# Patient Record
Sex: FEMALE | Race: WHITE | NOT HISPANIC OR LATINO | Employment: FULL TIME | URBAN - NONMETROPOLITAN AREA
[De-identification: names, ages, dates, MRNs, and addresses within clinical notes are randomized per-mention and may not be internally consistent; named-entity substitution may affect disease eponyms.]

---

## 2024-09-05 ENCOUNTER — APPOINTMENT (OUTPATIENT)
Dept: CARDIOLOGY | Facility: HOSPITAL | Age: 49
End: 2024-09-05
Payer: COMMERCIAL

## 2024-09-05 ENCOUNTER — APPOINTMENT (OUTPATIENT)
Dept: RADIOLOGY | Facility: HOSPITAL | Age: 49
End: 2024-09-05
Payer: COMMERCIAL

## 2024-09-05 ENCOUNTER — HOSPITAL ENCOUNTER (EMERGENCY)
Facility: HOSPITAL | Age: 49
Discharge: HOME | End: 2024-09-05
Attending: EMERGENCY MEDICINE
Payer: COMMERCIAL

## 2024-09-05 VITALS
SYSTOLIC BLOOD PRESSURE: 131 MMHG | RESPIRATION RATE: 18 BRPM | BODY MASS INDEX: 26.22 KG/M2 | DIASTOLIC BLOOD PRESSURE: 86 MMHG | HEIGHT: 63 IN | TEMPERATURE: 98.6 F | WEIGHT: 148 LBS | OXYGEN SATURATION: 96 % | HEART RATE: 74 BPM

## 2024-09-05 DIAGNOSIS — R00.2 PALPITATIONS: ICD-10-CM

## 2024-09-05 DIAGNOSIS — R07.9 CHEST PAIN, UNSPECIFIED TYPE: Primary | ICD-10-CM

## 2024-09-05 LAB
ALBUMIN SERPL BCP-MCNC: 4.9 G/DL (ref 3.4–5)
ALP SERPL-CCNC: 74 U/L (ref 33–110)
ALT SERPL W P-5'-P-CCNC: 16 U/L (ref 7–45)
ANION GAP SERPL CALC-SCNC: 11 MMOL/L (ref 10–20)
AST SERPL W P-5'-P-CCNC: 20 U/L (ref 9–39)
ATRIAL RATE: 69 BPM
ATRIAL RATE: 82 BPM
BASOPHILS # BLD AUTO: 0.05 X10*3/UL (ref 0–0.1)
BASOPHILS NFR BLD AUTO: 0.9 %
BILIRUB SERPL-MCNC: 0.4 MG/DL (ref 0–1.2)
BUN SERPL-MCNC: 18 MG/DL (ref 6–23)
CALCIUM SERPL-MCNC: 11 MG/DL (ref 8.6–10.3)
CARDIAC TROPONIN I PNL SERPL HS: <3 NG/L (ref 0–13)
CARDIAC TROPONIN I PNL SERPL HS: <3 NG/L (ref 0–13)
CHLORIDE SERPL-SCNC: 103 MMOL/L (ref 98–107)
CO2 SERPL-SCNC: 29 MMOL/L (ref 21–32)
CREAT SERPL-MCNC: 0.95 MG/DL (ref 0.5–1.05)
EGFRCR SERPLBLD CKD-EPI 2021: 74 ML/MIN/1.73M*2
EOSINOPHIL # BLD AUTO: 0.17 X10*3/UL (ref 0–0.7)
EOSINOPHIL NFR BLD AUTO: 3.1 %
ERYTHROCYTE [DISTWIDTH] IN BLOOD BY AUTOMATED COUNT: 11.6 % (ref 11.5–14.5)
GLUCOSE SERPL-MCNC: 137 MG/DL (ref 74–99)
HCT VFR BLD AUTO: 44.1 % (ref 36–46)
HGB BLD-MCNC: 14.4 G/DL (ref 12–16)
IMM GRANULOCYTES # BLD AUTO: 0.01 X10*3/UL (ref 0–0.7)
IMM GRANULOCYTES NFR BLD AUTO: 0.2 % (ref 0–0.9)
LYMPHOCYTES # BLD AUTO: 2.27 X10*3/UL (ref 1.2–4.8)
LYMPHOCYTES NFR BLD AUTO: 40.8 %
MAGNESIUM SERPL-MCNC: 1.97 MG/DL (ref 1.6–2.4)
MCH RBC QN AUTO: 30.8 PG (ref 26–34)
MCHC RBC AUTO-ENTMCNC: 32.7 G/DL (ref 32–36)
MCV RBC AUTO: 94 FL (ref 80–100)
MONOCYTES # BLD AUTO: 0.4 X10*3/UL (ref 0.1–1)
MONOCYTES NFR BLD AUTO: 7.2 %
NEUTROPHILS # BLD AUTO: 2.66 X10*3/UL (ref 1.2–7.7)
NEUTROPHILS NFR BLD AUTO: 47.8 %
NRBC BLD-RTO: 0 /100 WBCS (ref 0–0)
P AXIS: 72 DEGREES
P AXIS: 80 DEGREES
P OFFSET: 214 MS
P OFFSET: 217 MS
P ONSET: 162 MS
P ONSET: 163 MS
PLATELET # BLD AUTO: 224 X10*3/UL (ref 150–450)
POTASSIUM SERPL-SCNC: 4.1 MMOL/L (ref 3.5–5.3)
PR INTERVAL: 116 MS
PR INTERVAL: 118 MS
PROT SERPL-MCNC: 7.4 G/DL (ref 6.4–8.2)
Q ONSET: 220 MS
Q ONSET: 222 MS
QRS COUNT: 11 BEATS
QRS COUNT: 13 BEATS
QRS DURATION: 86 MS
QRS DURATION: 88 MS
QT INTERVAL: 370 MS
QT INTERVAL: 370 MS
QTC CALCULATION(BAZETT): 396 MS
QTC CALCULATION(BAZETT): 432 MS
QTC FREDERICIA: 387 MS
QTC FREDERICIA: 410 MS
R AXIS: 71 DEGREES
R AXIS: 80 DEGREES
RBC # BLD AUTO: 4.67 X10*6/UL (ref 4–5.2)
SODIUM SERPL-SCNC: 139 MMOL/L (ref 136–145)
T AXIS: 69 DEGREES
T AXIS: 82 DEGREES
T OFFSET: 405 MS
T OFFSET: 407 MS
VENTRICULAR RATE: 69 BPM
VENTRICULAR RATE: 82 BPM
WBC # BLD AUTO: 5.6 X10*3/UL (ref 4.4–11.3)

## 2024-09-05 PROCEDURE — 84443 ASSAY THYROID STIM HORMONE: CPT

## 2024-09-05 PROCEDURE — 36415 COLL VENOUS BLD VENIPUNCTURE: CPT | Performed by: EMERGENCY MEDICINE

## 2024-09-05 PROCEDURE — 83735 ASSAY OF MAGNESIUM: CPT | Performed by: EMERGENCY MEDICINE

## 2024-09-05 PROCEDURE — 93005 ELECTROCARDIOGRAM TRACING: CPT

## 2024-09-05 PROCEDURE — 71045 X-RAY EXAM CHEST 1 VIEW: CPT

## 2024-09-05 PROCEDURE — 85025 COMPLETE CBC W/AUTO DIFF WBC: CPT | Performed by: EMERGENCY MEDICINE

## 2024-09-05 PROCEDURE — 84484 ASSAY OF TROPONIN QUANT: CPT | Performed by: EMERGENCY MEDICINE

## 2024-09-05 PROCEDURE — 84075 ASSAY ALKALINE PHOSPHATASE: CPT | Performed by: EMERGENCY MEDICINE

## 2024-09-05 PROCEDURE — 99284 EMERGENCY DEPT VISIT MOD MDM: CPT | Mod: 25

## 2024-09-05 PROCEDURE — 71045 X-RAY EXAM CHEST 1 VIEW: CPT | Performed by: RADIOLOGY

## 2024-09-05 RX ORDER — DIPHENHYDRAMINE HCL 25 MG
25 TABLET ORAL NIGHTLY PRN
COMMUNITY

## 2024-09-05 RX ORDER — IBUPROFEN 200 MG
200 TABLET ORAL EVERY 6 HOURS
COMMUNITY

## 2024-09-05 ASSESSMENT — HEART SCORE
ECG: NORMAL
HISTORY: SLIGHTLY SUSPICIOUS
HEART SCORE: 1
RISK FACTORS: NO KNOWN RISK FACTORS
AGE: 45-64
TROPONIN: LESS THAN OR EQUAL TO NORMAL LIMIT

## 2024-09-05 ASSESSMENT — COLUMBIA-SUICIDE SEVERITY RATING SCALE - C-SSRS
1. IN THE PAST MONTH, HAVE YOU WISHED YOU WERE DEAD OR WISHED YOU COULD GO TO SLEEP AND NOT WAKE UP?: NO
2. HAVE YOU ACTUALLY HAD ANY THOUGHTS OF KILLING YOURSELF?: NO
6. HAVE YOU EVER DONE ANYTHING, STARTED TO DO ANYTHING, OR PREPARED TO DO ANYTHING TO END YOUR LIFE?: NO

## 2024-09-05 ASSESSMENT — PAIN DESCRIPTION - PAIN TYPE: TYPE: ACUTE PAIN

## 2024-09-05 ASSESSMENT — PAIN SCALES - GENERAL
PAINLEVEL_OUTOF10: 2
PAINLEVEL_OUTOF10: 3
PAINLEVEL_OUTOF10: 3

## 2024-09-05 ASSESSMENT — PAIN DESCRIPTION - LOCATION: LOCATION: CHEST

## 2024-09-05 ASSESSMENT — PAIN - FUNCTIONAL ASSESSMENT: PAIN_FUNCTIONAL_ASSESSMENT: 0-10

## 2024-09-05 NOTE — ED PROVIDER NOTES
HPI   No chief complaint on file.      Limitations to History: None    HPI: 49-year-old female presents with concern for left-sided palpitations and chest tightness.  States has been present for approximately an hour.  Radiating to her left neck and arm.  States this has been intermittent over the past 2 months.  Becoming more frequent.  Began today at work.  Denies any shortness of breath, nausea, vomiting, diaphoresis.  Denies any fall or trauma.    ------------------------------------------------------------------------------------------------------------------------------------------  Physical Exam:    VS: As documented in the triage note and EMR flowsheet from this visit were reviewed.    Appearance: Alert. cooperative,  in no acute distress.   Skin: Intact,  dry skin, no lesions, rash, petechiae or purpura.   Eyes: PERRLA, EOMs intact,  Conjunctiva pink with no redness or exudates.   HENT: Normocephalic, atraumatic. Nares patent. No intraoral lesions.   Neck: Supple, without meningismus. Trachea at midline. No lymphadenopathy.  Pulmonary: Clear bilaterally with good chest wall excursion. No rales, rhonchi or wheezing. No accessory muscle use or stridor.  Cardiac: Regular rate and rhythm, no rubs, murmurs, or gallops.   Abdomen: Abdomen is soft, nontender, and nondistended.  No palpable organomegaly.  No rebound or guarding.  No CVA tenderness. Nonsurgical abdomen.  Genitourinary: Exam deferred.  Musculoskeletal: Full range of motion.  Pulses full and equal. No cyanosis, clubbing, or edema.  Neurological:  Cranial nerves are grossly intact, grossly normal sensation, no weakness, no focal findings identified.  Psychiatric: Appropriate mood and affect.                Patient History   No past medical history on file.  No past surgical history on file.  No family history on file.  Social History     Tobacco Use    Smoking status: Not on file    Smokeless tobacco: Not on file   Substance Use Topics    Alcohol use:  Not on file    Drug use: Not on file       Physical Exam   ED Triage Vitals   Temp Pulse Resp BP   -- -- -- --      SpO2 Temp src Heart Rate Source Patient Position   -- -- -- --      BP Location FiO2 (%)     -- --       Physical Exam      ED Course & MDM   Diagnoses as of 09/05/24 1446   Chest pain, unspecified type   Palpitations                 No data recorded                                 Medical Decision Making  Labs Reviewed  COMPREHENSIVE METABOLIC PANEL - Abnormal     Glucose                       137 (*)                Sodium                        139                    Potassium                     4.1                    Chloride                      103                    Bicarbonate                   29                     Anion Gap                     11                     Urea Nitrogen                 18                     Creatinine                    0.95                   eGFR                          74                     Calcium                       11.0 (*)               Albumin                       4.9                    Alkaline Phosphatase          74                     Total Protein                 7.4                    AST                           20                     Bilirubin, Total              0.4                    ALT                           16                  MAGNESIUM - Normal     Magnesium                     1.97                SERIAL TROPONIN-INITIAL - Normal     Troponin I, High Sensitivity   <3                         Narrative: Less than 99th percentile of normal range cutoff-                  Female and children under 18 years old <14 ng/L; Male <21 ng/L: Negative                  Repeat testing should be performed if clinically indicated.                                     Female and children under 18 years old 14-50 ng/L; Male 21-50 ng/L:                  Consistent with possible cardiac damage and possible increased clinical                   risk. Serial  measurements may help to assess extent of myocardial damage.                                     >50 ng/L: Consistent with cardiac damage, increased clinical risk and                  myocardial infarction. Serial measurements may help assess extent of                   myocardial damage.                                      NOTE: Children less than 1 year old may have higher baseline troponin                   levels and results should be interpreted in conjunction with the overall                   clinical context.                                     NOTE: Troponin I testing is performed using a different                   testing methodology at [facility] than at other                   [facility]. Direct result comparisons should only                   be made within the same method.  SERIAL TROPONIN, 1 HOUR - Normal     Troponin I, High Sensitivity   <3                         Narrative: Less than 99th percentile of normal range cutoff-                  Female and children under 18 years old <14 ng/L; Male <21 ng/L: Negative                  Repeat testing should be performed if clinically indicated.                                     Female and children under 18 years old 14-50 ng/L; Male 21-50 ng/L:                  Consistent with possible cardiac damage and possible increased clinical                   risk. Serial measurements may help to assess extent of myocardial damage.                                     >50 ng/L: Consistent with cardiac damage, increased clinical risk and                  myocardial infarction. Serial measurements may help assess extent of                   myocardial damage.                                      NOTE: Children less than 1 year old may have higher baseline troponin                   levels and results should be interpreted in conjunction with the overall                   clinical context.                                     NOTE: Troponin I  testing is performed using a different                   testing methodology at [facility] than at other                   [facility]. Direct result comparisons should only                   be made within the same method.  TROPONIN SERIES- (INITIAL, 1 HR)         Narrative: The following orders were created for panel order Troponin I Series, High Sensitivity (0, 1 HR).                  Procedure                               Abnormality         Status                                     ---------                               -----------         ------                                     Troponin I, High Sensiti...[[id number]]  Normal              Final result                               Troponin, High Sensitivi...[[id number]]  Normal              Final result                                                 Please view results for these tests on the individual orders.  CBC WITH AUTO DIFFERENTIAL  XR chest 1 view   Final Result    Chronic elevation of left hemidiaphragm although less prominent than    in 2015.          No acute pulmonary disease otherwise.                MACRO:    None          Signed by: Damian Amato 9/5/2024 1:10 PM    Dictation workstation:   [id number]     Medical Decision Making:    Patient appears well nontoxic.  Hypertension upon arrival which is resolved without antihypertensives.  Lab work unremarkable.  EKG and high-sensitivity troponin negative x 2.  Heart score of 1.  Patient be given follow-up with primary care and cardiology.  Asked to return for new or worsening symptoms.  Stable at time of discharge.    Differential Diagnoses Considered: ACS, pneumothorax, pneumonia, musculoskeletal pain, anxiety, tachyarrhythmia    Independent Interpretation of Studies:  I independently interpreted: Chest x-ray shows no evidence of pneumonia or pneumothorax.    Escalation of Care:  Appropriate for discharge and follow-up with primary care and cardiology.          Procedure  ECG  12 lead    Performed by: Mick Carrillo DO  Authorized by: Mick Carrillo DO    ECG interpreted by ED Physician in the absence of a cardiologist: yes    Comments:      EKG interpreted by Dr. Mick Carrillo: Normal sinus rhythm at 82 bpm.  NJ interval 160 ms.  QTc of 432 ms.  Nonspecific ST changes  ECG 12 lead    Performed by: Mick Carrillo DO  Authorized by: Mick Carrillo DO    ECG interpreted by ED Physician in the absence of a cardiologist: yes    Comments:      Repeat EKG performed at 1419 interpreted by Dr. Mick Carrillo at 1421: Normal sinus rhythm at 69 bpm.  NJ interval 118 ms.  QTc of 396 ms.  No evolving changes.       Mick Carrillo DO  09/05/24 1445

## 2024-09-06 NOTE — PROGRESS NOTES
[facility]  Cardiology Clinic Visit Note    This is a pleasant 49 y.o. female never smoker with a history of anxiety who presents to the clinic for ED visit follow-up with complaints of palpitations and chest pain..    On 9/5/2024 she presented to the [facility] emergency department with complaints of palpitations and left-sided chest pain woke her up and persisted for for an hour.  At that time she also felt a muscle spasm in her left bicep.  She states this was intermittent over the past 2 months.  ED workup showed negative troponin x 2 and EKG showed normal sinus rhythm with no acute ischemic changes.  She was subsequently discharged with follow-up in outpatient cardiology.    Jodi has had minimal symptoms since the episode precipitating her ED visit on September 5. She feels palpitations occasionally at night last few seconds and is not associated with any further chest pain shortness of breath dizziness or lightheadedness.  She does report having high anxiety for many years.  Chest pain that she felt was a left sided, aching pressure, 3 out of 10 in severity and resolved spontaneously with no aggravating factors.     Active Issues  Chest pain, atypical  Hyperlipidemia, pure hypercholesterolemia  Palpitations  -not on a statin  -Lipid panel 2019 shows     Past Medical History  No past medical history on file.    Past Surgical History  Past Surgical History:   Procedure Laterality Date    KNEE ARTHROSCOPY W/ LASER      TONSILLECTOMY      WISDOM TOOTH EXTRACTION         Medications  Current Outpatient Medications   Medication Instructions    diphenhydrAMINE (SOMINEX) 25 mg, oral, Nightly PRN    ibuprofen 200 mg, oral, Every 6 hours    MAGNESIUM CITRATE ORAL 120 mg, oral, Daily    UNABLE TO FIND 1 tablet, Daily, Med Name: catecahla claim stress       Allergies  Allergies   Allergen Reactions    Azithromycin GI Upset    Corn GI Upset    Gluten Other       Social  History  Social History     Tobacco Use    Smoking status: Never     Passive exposure: Past    Smokeless tobacco: Never   Substance Use Topics    Alcohol use: Not Currently    Drug use: Not Currently     Types: Marijuana       Family History  Family History   Problem Relation Name Age of Onset    Stroke Paternal Grandfather         VITALS  Vitals:    09/09/24 1349   BP: 122/80   Pulse: 77   SpO2: 99%       Weight  Vitals:    09/09/24 1349   Weight: 66.4 kg (146 lb 4.8 oz)       PHYSICAL EXAM  Physical Exam  Vitals and nursing note reviewed.   Constitutional:       General: She is not in acute distress.     Appearance: She is normal weight.   HENT:      Head: Normocephalic and atraumatic.      Mouth/Throat:      Mouth: Mucous membranes are moist.      Pharynx: Oropharynx is clear.   Eyes:      General: No scleral icterus.     Pupils: Pupils are equal, round, and reactive to light.   Cardiovascular:      Rate and Rhythm: Normal rate and regular rhythm.      Pulses: Normal pulses.      Heart sounds: Normal heart sounds, S1 normal and S2 normal. No murmur heard.     No friction rub.   Pulmonary:      Effort: Pulmonary effort is normal.      Breath sounds: Normal breath sounds.   Abdominal:      General: Bowel sounds are normal. There is no distension.      Palpations: Abdomen is soft.      Tenderness: There is no abdominal tenderness.   Musculoskeletal:         General: No swelling. Normal range of motion.      Cervical back: Normal range of motion and neck supple.      Right lower leg: No edema.      Left lower leg: No edema.   Skin:     General: Skin is warm and dry.      Capillary Refill: Capillary refill takes less than 2 seconds.      Findings: No rash.   Neurological:      General: No focal deficit present.      Mental Status: She is alert.   Psychiatric:         Mood and Affect: Mood normal.         Behavior: Behavior normal.         Cardiovascular Labs  Lab Results   Component Value Date    HGB 14.4 09/05/2024     HGB 14.2 03/01/2021     09/05/2024    WBC 5.6 09/05/2024     09/05/2024    K 4.1 09/05/2024    CREATININE 0.95 09/05/2024    CREATININE 0.94 03/01/2021    CREATININE 0.84 12/27/2019    BUN 18 09/05/2024    CALCIUM 11.0 (H) 09/05/2024    TROPHS <3 09/05/2024    TROPHS <3 09/05/2024    LDLF 134 (H) 12/27/2019       Assessment and Plan  -Her chest pain is unlikely to be anginal on chronic in nature, is atypical.  I have low suspicion of arrhythmias for palpitations, is more likely benign ectopy and anxiety driven however we will obtain a 7-day Holter monitor to assess for any cardiac arrhythmias.  She is overall a low cardiac risk.  I offered her a noncontrast cardiac CT for calcium scoring which she agreed to get an effort to further stratify her cardiac risk.  I will also order TSH with reflex to evaluate for presence of any thyroid abnormalities that might be driving her palpitations.    Return to Care:  After testing.     If your symptoms worsen or progress please go directory to your nearest emergency department for evaluation.     Thank you for this interesting clinical case and allowing me to participate in the care of this patient. Please reach me out if you have any questions or if you need any clarifications regarding this patient's care.    **Disclaimer: This note was dictated by speech recognition, and every effort has been made to prevent any error in transcription, however minor errors may be present**  ___________________________________________________  Lauro Browning, MSN, CNP, ACNPC, CCRN  Advanced Practice Provider, Nurse Practitioner  Division of Cardiovascular Medicine  [facility] [place]  [facility]

## 2024-09-09 ENCOUNTER — APPOINTMENT (OUTPATIENT)
Dept: CARDIOLOGY | Facility: CLINIC | Age: 49
End: 2024-09-09
Payer: COMMERCIAL

## 2024-09-09 VITALS
HEIGHT: 63 IN | BODY MASS INDEX: 25.92 KG/M2 | HEART RATE: 77 BPM | SYSTOLIC BLOOD PRESSURE: 122 MMHG | DIASTOLIC BLOOD PRESSURE: 80 MMHG | WEIGHT: 146.3 LBS | OXYGEN SATURATION: 99 %

## 2024-09-09 DIAGNOSIS — R00.2 PALPITATIONS: ICD-10-CM

## 2024-09-09 DIAGNOSIS — R07.9 CHEST PAIN, UNSPECIFIED TYPE: ICD-10-CM

## 2024-09-09 LAB — TSH SERPL-ACNC: 2.07 MIU/L (ref 0.44–3.98)

## 2024-09-09 PROCEDURE — 99204 OFFICE O/P NEW MOD 45 MIN: CPT

## 2024-09-09 PROCEDURE — 1036F TOBACCO NON-USER: CPT

## 2024-09-09 PROCEDURE — 3008F BODY MASS INDEX DOCD: CPT

## 2024-09-10 ENCOUNTER — HOSPITAL ENCOUNTER (OUTPATIENT)
Dept: CARDIOLOGY | Facility: HOSPITAL | Age: 49
Discharge: HOME | End: 2024-09-10
Payer: COMMERCIAL

## 2024-09-10 DIAGNOSIS — R07.9 CHEST PAIN, UNSPECIFIED TYPE: ICD-10-CM

## 2024-09-10 DIAGNOSIS — R00.2 PALPITATIONS: ICD-10-CM

## 2024-09-10 PROCEDURE — 93242 EXT ECG>48HR<7D RECORDING: CPT

## 2024-09-10 PROCEDURE — 9420000001 HC RT PATIENT EDUCATION 5 MIN

## 2024-09-18 DIAGNOSIS — Z13.220 ENCOUNTER FOR SCREENING FOR LIPOID DISORDERS: Primary | ICD-10-CM

## 2024-10-08 ENCOUNTER — TELEPHONE (OUTPATIENT)
Dept: CARDIOLOGY | Facility: CLINIC | Age: 49
End: 2024-10-08
Payer: COMMERCIAL

## 2024-10-08 PROBLEM — E78.5 HYPERLIPIDEMIA: Status: ACTIVE | Noted: 2024-10-08

## 2024-10-08 NOTE — TELEPHONE ENCOUNTER
PT NOTIFIED.     ----- Message from Nurse Geneva MIR sent at 10/8/2024  9:48 AM EDT -----  Regarding: RE: Lipid Panel  Called pt and left message.  ----- Message -----  From: HARMONY Noble  Sent: 10/8/2024   9:44 AM EDT  To: Do [id number] Clinical Support Staff  Subject: Lipid Panel                                      She needs a fasting lipid panel before our visit next week please.

## 2024-10-08 NOTE — PROGRESS NOTES
[facility]  Cardiology Clinic Visit Note    History of present illness:  This is a pleasant 49 y.o. female never smoker with a history of anxiety who presents to the clinic for 6 week follow up.   On 9/5/2024 she presented to the [facility] emergency department with complaints of palpitations and left-sided chest pain woke her up and persisted for for an hour.  At that time she also felt a muscle spasm in her left bicep.  She states this was intermittent over the past 2 months.  ED workup showed negative troponin x 2 and EKG showed normal sinus rhythm with no acute ischemic changes.  She was subsequently discharged with follow-up in outpatient cardiology.     At her last visit I felt that her chest pain was unlikely to be anginal and is atypical.  I ordered a 7-day Holter monitor to assess for cardiac arrhythmia and a CT coronary calcium score to stratify her risk due to hyperlipidemia and also ordered thyroid labs due to palpitations.    Subjective:  Since her last visit she has had no symptoms of any kind chest pain or palpitations. She denies chest pain, shortness of breath, palpitations, leg edema, fever, chills, orthopnea, paroxysmal nocturnal dyspnea or syncope.     Active Issues  Chest pain, atypical  Hyperlipidemia, pure hypercholesterolemia  Palpitations  -TSH normal  -7-day Holter monitor 9/10/2024 showed predominantly sinus rhythm with an average heart rate of 77 bpm with a range of 47 to 168 bpm.  2 episodes of paroxysmal SVT with the longest lasting 4 beats.  PAC and PVC burden less than 1%.  No significant atrial or ventricular arrhythmias.  -Lipid 10/11/2024 panel shows , HDL 59, triglycerides 106, and total cholesterol 236.   -CT coronary calcium scoring 10/9/2024 shows total coronary artery calcium score of 0.    Past Medical History  No past medical history on file.    Past Surgical History  Past Surgical History:   Procedure Laterality Date    KNEE  ARTHROSCOPY W/ LASER      TONSILLECTOMY      WISDOM TOOTH EXTRACTION         Medications  Current Outpatient Medications   Medication Instructions    diphenhydrAMINE (SOMINEX) 25 mg, oral, Nightly PRN    ibuprofen 200 mg, oral, Every 6 hours PRN    MAGNESIUM CITRATE ORAL 120 mg, oral, Daily    UNABLE TO FIND 1 tablet, Daily, Med Name: marquise claim stress       Allergies  Allergies   Allergen Reactions    Azithromycin GI Upset    Corn GI Upset    Gluten Other       Social History  Social History     Tobacco Use    Smoking status: Never     Passive exposure: Past    Smokeless tobacco: Never   Substance Use Topics    Alcohol use: Not Currently    Drug use: Not Currently     Types: Marijuana       Family History  Family History   Problem Relation Name Age of Onset    Stroke Paternal Grandfather         VITALS  Vitals:    10/14/24 0810   BP: 120/76   Pulse: 76   SpO2: 98%       Weight  Vitals:    10/14/24 0810   Weight: 68 kg (150 lb)       PHYSICAL EXAM  Physical Exam  Vitals and nursing note reviewed.   Constitutional:       General: She is not in acute distress.  HENT:      Head: Normocephalic and atraumatic.      Mouth/Throat:      Mouth: Mucous membranes are moist.      Pharynx: Oropharynx is clear.   Eyes:      General: No scleral icterus.     Pupils: Pupils are equal, round, and reactive to light.   Cardiovascular:      Rate and Rhythm: Normal rate and regular rhythm.      Pulses: Normal pulses.      Heart sounds: Normal heart sounds, S1 normal and S2 normal. No murmur heard.     No friction rub.   Pulmonary:      Effort: Pulmonary effort is normal.      Breath sounds: Normal breath sounds.   Abdominal:      General: Bowel sounds are normal. There is no distension.      Palpations: Abdomen is soft.      Tenderness: There is no abdominal tenderness.   Musculoskeletal:         General: Normal range of motion.      Cervical back: Normal range of motion and neck supple.      Right lower leg: No edema.      Left  lower leg: No edema.   Skin:     General: Skin is warm and dry.      Capillary Refill: Capillary refill takes less than 2 seconds.      Findings: No rash.   Neurological:      General: No focal deficit present.      Mental Status: She is alert.   Psychiatric:         Mood and Affect: Mood normal.         Behavior: Behavior normal.         Cardiovascular Labs  Lab Results   Component Value Date    HGB 14.4 09/05/2024    HGB 14.2 03/01/2021     09/05/2024    WBC 5.6 09/05/2024     09/05/2024    K 4.1 09/05/2024    CREATININE 0.95 09/05/2024    CREATININE 0.94 03/01/2021    CREATININE 0.84 12/27/2019    BUN 18 09/05/2024    CALCIUM 11.0 (H) 09/05/2024    TROPHS <3 09/05/2024    TROPHS <3 09/05/2024    LDLF 134 (H) 12/27/2019       Echocardiogram   No results found for this or any previous visit from the past 1095 days.      Assessment and Plan  The 10-year ASCVD risk score (Murali DK, et al., 2019) is: 1.2%    Values used to calculate the score:      Age: 49 years      Sex: Female      Is Non- : No      Diabetic: No      Tobacco smoker: No      Systolic Blood Pressure: 120 mmHg      Is BP treated: No      HDL Cholesterol: 59 mg/dL      Total Cholesterol: 236 mg/dL  Low Risk: <5%  Borderline Risk: 5%-7.4%  Intermediate Risk: 7.5% - 19.9%  High Risk: >20%    -Chest pain non-anginal and atypical nature, likely musculoskeletal or anxiety driven.  Palpitations are benign and require no further testing.  Her LDL is slightly elevated since her previous but her 10-year risk is very low so she will pursue lifestyle modifications such as regular cardiovascular exercise and adopting a Mediterranean style diet.    Return to Care:  As needed    If your symptoms worsen or progress please go directory to your nearest emergency department for evaluation.     Thank you for this interesting clinical case and allowing me to participate in the care of this patient. Please reach me out if you have any  questions or if you need any clarifications regarding this patient's care.    **Disclaimer: This note was dictated by speech recognition, and every effort has been made to prevent any error in transcription, however minor errors may be present**  ___________________________________________________  Lauro Browning, MSN, CNP, ACNPC, CCRN  Advanced Practice Provider, Nurse Practitioner  Division of Cardiovascular Medicine  [facility] [place]  [facility]

## 2024-10-09 ENCOUNTER — HOSPITAL ENCOUNTER (OUTPATIENT)
Dept: RADIOLOGY | Facility: HOSPITAL | Age: 49
Discharge: HOME | End: 2024-10-09
Payer: COMMERCIAL

## 2024-10-09 ENCOUNTER — APPOINTMENT (OUTPATIENT)
Dept: CARDIOLOGY | Facility: HOSPITAL | Age: 49
End: 2024-10-09
Payer: COMMERCIAL

## 2024-10-09 DIAGNOSIS — R07.9 CHEST PAIN, UNSPECIFIED TYPE: ICD-10-CM

## 2024-10-09 PROCEDURE — 75571 CT HRT W/O DYE W/CA TEST: CPT

## 2024-10-11 ENCOUNTER — LAB (OUTPATIENT)
Dept: LAB | Facility: LAB | Age: 49
End: 2024-10-11
Payer: COMMERCIAL

## 2024-10-11 DIAGNOSIS — E78.5 HYPERLIPIDEMIA, UNSPECIFIED HYPERLIPIDEMIA TYPE: ICD-10-CM

## 2024-10-11 LAB
CHOLEST SERPL-MCNC: 236 MG/DL (ref 0–199)
CHOLESTEROL/HDL RATIO: 4
HDLC SERPL-MCNC: 59 MG/DL
LDLC SERPL CALC-MCNC: 156 MG/DL
NON HDL CHOLESTEROL: 177 MG/DL (ref 0–149)
TRIGL SERPL-MCNC: 106 MG/DL (ref 0–149)
VLDL: 21 MG/DL (ref 0–40)

## 2024-10-11 PROCEDURE — 36415 COLL VENOUS BLD VENIPUNCTURE: CPT

## 2024-10-11 PROCEDURE — 80061 LIPID PANEL: CPT

## 2024-10-14 ENCOUNTER — APPOINTMENT (OUTPATIENT)
Dept: CARDIOLOGY | Facility: CLINIC | Age: 49
End: 2024-10-14
Payer: COMMERCIAL

## 2024-10-14 VITALS
OXYGEN SATURATION: 98 % | BODY MASS INDEX: 26.58 KG/M2 | HEART RATE: 76 BPM | SYSTOLIC BLOOD PRESSURE: 120 MMHG | HEIGHT: 63 IN | DIASTOLIC BLOOD PRESSURE: 76 MMHG | WEIGHT: 150 LBS

## 2024-10-14 DIAGNOSIS — E78.5 HYPERLIPIDEMIA, UNSPECIFIED HYPERLIPIDEMIA TYPE: Primary | ICD-10-CM

## 2024-10-14 PROCEDURE — 1036F TOBACCO NON-USER: CPT

## 2024-10-14 PROCEDURE — 3008F BODY MASS INDEX DOCD: CPT

## 2024-10-14 PROCEDURE — 99213 OFFICE O/P EST LOW 20 MIN: CPT

## 2024-10-21 ENCOUNTER — APPOINTMENT (OUTPATIENT)
Dept: CARDIOLOGY | Facility: CLINIC | Age: 49
End: 2024-10-21
Payer: COMMERCIAL

## 2024-12-11 ENCOUNTER — HOSPITAL ENCOUNTER (OUTPATIENT)
Dept: RADIOLOGY | Facility: HOSPITAL | Age: 49
Discharge: HOME | End: 2024-12-11
Payer: COMMERCIAL

## 2024-12-11 DIAGNOSIS — M99.01 SEGMENTAL AND SOMATIC DYSFUNCTION OF CERVICAL REGION: ICD-10-CM

## 2024-12-11 DIAGNOSIS — M99.03 SEGMENTAL AND SOMATIC DYSFUNCTION OF LUMBAR REGION: ICD-10-CM

## 2024-12-11 PROCEDURE — 72050 X-RAY EXAM NECK SPINE 4/5VWS: CPT

## 2024-12-11 PROCEDURE — 72110 X-RAY EXAM L-2 SPINE 4/>VWS: CPT

## 2024-12-11 PROCEDURE — 72050 X-RAY EXAM NECK SPINE 4/5VWS: CPT | Performed by: RADIOLOGY

## 2024-12-11 PROCEDURE — 72110 X-RAY EXAM L-2 SPINE 4/>VWS: CPT | Performed by: RADIOLOGY
